# Patient Record
Sex: FEMALE | URBAN - METROPOLITAN AREA
[De-identification: names, ages, dates, MRNs, and addresses within clinical notes are randomized per-mention and may not be internally consistent; named-entity substitution may affect disease eponyms.]

---

## 2024-10-10 ENCOUNTER — OFFICE VISIT (OUTPATIENT)
Dept: URGENT CARE | Age: 19
End: 2024-10-10
Payer: COMMERCIAL

## 2024-10-10 VITALS
OXYGEN SATURATION: 98 % | TEMPERATURE: 96.8 F | SYSTOLIC BLOOD PRESSURE: 116 MMHG | DIASTOLIC BLOOD PRESSURE: 68 MMHG | RESPIRATION RATE: 16 BRPM | HEART RATE: 68 BPM

## 2024-10-10 DIAGNOSIS — B96.89 BACTERIAL SINUSITIS: Primary | ICD-10-CM

## 2024-10-10 DIAGNOSIS — J32.9 BACTERIAL SINUSITIS: Primary | ICD-10-CM

## 2024-10-10 RX ORDER — AZITHROMYCIN 250 MG/1
TABLET, FILM COATED ORAL
Qty: 6 TABLET | Refills: 0 | Status: SHIPPED | OUTPATIENT
Start: 2024-10-10 | End: 2024-10-15

## 2024-10-10 ASSESSMENT — ENCOUNTER SYMPTOMS
SINUS PAIN: 1
SINUS PRESSURE: 1
COUGH: 1
SORE THROAT: 1
SINUS COMPLAINT: 1

## 2024-10-10 NOTE — PROGRESS NOTES
Subjective   Patient ID: Ana Maria Pappas is a 19 y.o. female. They present today with a chief complaint of Sinus Problem (X 2 weeks).    History of Present Illness  Ana Maria Pappas is a 19 y.o. female. They present today with a chief complaint of Sinus Problem (X 2 weeks). Concern for sinus infection. Pxp rx flonase with minimal relief. Here for eval  Sinus Problem  Associated symptoms: congestion, cough and sore throat        Past Medical History  Allergies as of 10/10/2024    (No Known Allergies)       (Not in a hospital admission)       History reviewed. No pertinent past medical history.    History reviewed. No pertinent surgical history.         Review of Systems  Review of Systems   HENT:  Positive for congestion, sinus pressure, sinus pain and sore throat.    Respiratory:  Positive for cough.          Objective    Vitals:    10/10/24 0805   BP: 116/68   Pulse: 68   Resp: 16   Temp: 36 °C (96.8 °F)   SpO2: 98%     No LMP recorded.    Physical Exam  Vitals and nursing note reviewed.   Constitutional:       Appearance: Normal appearance.   HENT:      Head: Normocephalic and atraumatic.      Right Ear: Tympanic membrane normal.      Left Ear: Tympanic membrane normal.      Nose: Nose normal.      Mouth/Throat:      Mouth: Mucous membranes are moist.      Pharynx: Oropharynx is clear.   Eyes:      Extraocular Movements: Extraocular movements intact.      Conjunctiva/sclera: Conjunctivae normal.      Pupils: Pupils are equal, round, and reactive to light.   Cardiovascular:      Rate and Rhythm: Normal rate and regular rhythm.   Pulmonary:      Effort: Pulmonary effort is normal.      Breath sounds: Normal breath sounds.   Abdominal:      General: Bowel sounds are normal.      Palpations: Abdomen is soft.   Musculoskeletal:         General: Normal range of motion.      Cervical back: Normal range of motion and neck supple.   Skin:     General: Skin is warm.      Capillary Refill: Capillary refill takes less than 2  seconds.   Neurological:      General: No focal deficit present.      Mental Status: She is alert and oriented to person, place, and time. Mental status is at baseline.   Psychiatric:         Mood and Affect: Mood normal.         Behavior: Behavior normal.         Thought Content: Thought content normal.         Judgment: Judgment normal.         Procedures    Point of Care Test & Imaging Results from this visit  No results found for this visit on 10/10/24.   No results found.    Diagnostic study results (if any) were reviewed by ROBIN Alcala.    Assessment/Plan   Allergies, medications, history, and pertinent labs/EKGs/Imaging reviewed by ROBIN Alcala.     Medical Decision Making  S/s oif bacterial sinusitis.     Zpack sent    Tylenol, motrin, flonase, and claritin. Follow up with pcp    As a result of the work-up, the patient was discharged home.  she was informed of her diagnosis and instructed to come back with any concerns or worsening of condition.  she and was agreeable to the plan as discussed above.  she was given the opportunity to ask questions.  All of the patient's questions were answered.    This document was generated using the assistance of voice recognition software. If there are any errors of spelling, grammar, syntax, or meaning; please feel free to contact me directly for clarification.     Orders and Diagnoses  Diagnoses and all orders for this visit:  Bacterial sinusitis      Medical Admin Record      Patient disposition: Home    Electronically signed by ROBIN Alcala  8:07 AM

## 2025-05-04 ENCOUNTER — OFFICE VISIT (OUTPATIENT)
Dept: URGENT CARE | Age: 20
End: 2025-05-04
Payer: COMMERCIAL

## 2025-05-04 VITALS
HEART RATE: 73 BPM | RESPIRATION RATE: 17 BRPM | SYSTOLIC BLOOD PRESSURE: 120 MMHG | TEMPERATURE: 97.9 F | OXYGEN SATURATION: 98 % | DIASTOLIC BLOOD PRESSURE: 80 MMHG

## 2025-05-04 DIAGNOSIS — R09.89 CHEST CONGESTION: Primary | ICD-10-CM

## 2025-05-04 PROCEDURE — 99213 OFFICE O/P EST LOW 20 MIN: CPT

## 2025-05-04 RX ORDER — DOXYCYCLINE 100 MG/1
100 CAPSULE ORAL 2 TIMES DAILY
Qty: 14 CAPSULE | Refills: 0 | Status: SHIPPED | OUTPATIENT
Start: 2025-05-04 | End: 2025-05-11

## 2025-05-04 ASSESSMENT — ENCOUNTER SYMPTOMS
EYES NEGATIVE: 1
COUGH: 1
CONSTITUTIONAL NEGATIVE: 1
CARDIOVASCULAR NEGATIVE: 1

## 2025-05-04 NOTE — PATIENT INSTRUCTIONS
Consider taking Mucinex for congestion. Make sure to drink plenty of fluids while taking this medication as it increases its effectiveness.     Consider Zyrtec or Claritin    Consider Flonase Nasal Spray    Consider taking Sudafed to help decrease any congestion. If no history of high blood pressure.  Consider Corcedin BP for high blood pressure.    Use throat lozenges, buckwheat honey, gargling salt water to help relieve sore throat symptoms.     Recommend inhaling steam from a hot shower or hot bath as well as using saline sinus rinse for congestion. Recommend Yeyo Med sinus rinse. Make sure to use bottled or distilled water.

## 2025-05-04 NOTE — PROGRESS NOTES
"Subjective   Patient ID: Ana Maria Pappas is a 19 y.o. female. They present today with a chief complaint of Cough (Presents with cough, paranasal drainage, \"heavy\" chest for one week. Seen by PCP 4/25/25 denies treatment.  ).    History of Present Illness  19-year-old female presents clinic today with complaints of cough.  Patient states she has had postnasal drainage as well.  This been ongoing for slightly over a week.  She denies fevers or chills.  She states there is a heaviness in the chest from the congestion.  Denies chest pain.  She has been taking multiple over-the-counter cough and cold medications with little relief.          Past Medical History  Allergies as of 05/04/2025    (No Known Allergies)       Prescriptions Prior to Admission[1]     Medical History[2]    Surgical History[3]         Review of Systems  Review of Systems   Constitutional: Negative.    HENT:  Positive for congestion and postnasal drip.    Eyes: Negative.    Respiratory:  Positive for cough.    Cardiovascular: Negative.                                   Objective    Vitals:    05/04/25 1641   BP: 120/80   Pulse: 73   Resp: 17   Temp: 36.6 °C (97.9 °F)   TempSrc: Oral   SpO2: 98%     No LMP recorded.    Physical Exam  Constitutional:       General: She is not in acute distress.     Appearance: Normal appearance.   Cardiovascular:      Rate and Rhythm: Normal rate and regular rhythm.      Heart sounds: Normal heart sounds.   Pulmonary:      Effort: Pulmonary effort is normal.      Breath sounds: Examination of the right-lower field reveals rales. Examination of the left-lower field reveals rales. Rales present.   Neurological:      Mental Status: She is alert.         Procedures    Point of Care Test & Imaging Results from this visit  No results found for this visit on 05/04/25.   Imaging  No results found.    Cardiology, Vascular, and Other Imaging  No other imaging results found for the past 2 days      Diagnostic study results " (if any) were reviewed by Donaldo Parikh PA-C.    Assessment/Plan   Allergies, medications, history, and pertinent labs/EKGs/Imaging reviewed by Donaldo Parikh PA-C.     Medical Decision Making  Patient pleasant cooperative on examination.    Pulmonary examination did reveal some fine crackles to bilateral lower lung fields.  With this being found along with the continued congestion for over a week despite over-the-counter medications I did start patient on doxycycline.  This will cover the possibility of pneumonia versus sinusitis versus other.    I advised on proper over-the-counter medications to supplement with.    Monitor him for worsening or persistent signs.    Patient in agreement with plan.  Patient alert and oriented, no acute stress upon discharge.    Orders and Diagnoses  Diagnoses and all orders for this visit:  Chest congestion  -     doxycycline (Vibramycin) 100 mg capsule; Take 1 capsule (100 mg) by mouth 2 times a day for 7 days. Take with at least 8 ounces (large glass) of water, do not lie down for 30 minutes after      Medical Admin Record      Patient disposition: Home    Electronically signed by Donaldo Parikh PA-C  6:00 PM           [1] (Not in a hospital admission)   [2] No past medical history on file.  [3] No past surgical history on file.

## 2025-05-06 DIAGNOSIS — R09.89 CHEST CONGESTION: Primary | ICD-10-CM

## 2025-05-06 RX ORDER — ALBUTEROL SULFATE 90 UG/1
2 INHALANT RESPIRATORY (INHALATION) EVERY 4 HOURS PRN
Qty: 8 G | Refills: 0 | Status: SHIPPED | OUTPATIENT
Start: 2025-05-06 | End: 2026-05-06

## 2025-05-06 RX ORDER — AMOXICILLIN AND CLAVULANATE POTASSIUM 875; 125 MG/1; MG/1
1 TABLET, FILM COATED ORAL 2 TIMES DAILY
Qty: 20 TABLET | Refills: 0 | Status: SHIPPED | OUTPATIENT
Start: 2025-05-06 | End: 2025-05-16

## 2025-05-30 ENCOUNTER — OFFICE VISIT (OUTPATIENT)
Dept: URGENT CARE | Age: 20
End: 2025-05-30
Payer: COMMERCIAL

## 2025-05-30 VITALS
TEMPERATURE: 98.5 F | RESPIRATION RATE: 17 BRPM | OXYGEN SATURATION: 98 % | DIASTOLIC BLOOD PRESSURE: 80 MMHG | HEART RATE: 80 BPM | SYSTOLIC BLOOD PRESSURE: 123 MMHG

## 2025-05-30 DIAGNOSIS — L30.9 DERMATITIS: Primary | ICD-10-CM

## 2025-05-30 PROCEDURE — 99213 OFFICE O/P EST LOW 20 MIN: CPT | Performed by: NURSE PRACTITIONER

## 2025-05-30 RX ORDER — METHYLPREDNISOLONE 4 MG/1
TABLET ORAL
Qty: 21 TABLET | Refills: 0 | Status: SHIPPED | OUTPATIENT
Start: 2025-05-30 | End: 2025-06-05

## 2025-05-30 RX ORDER — TRIAMCINOLONE ACETONIDE 1 MG/G
CREAM TOPICAL
Qty: 80 G | Refills: 0 | Status: SHIPPED | OUTPATIENT
Start: 2025-05-30

## 2025-05-30 ASSESSMENT — ENCOUNTER SYMPTOMS
SHORTNESS OF BREATH: 0
RHINORRHEA: 0
DIARRHEA: 0
COUGH: 0
FATIGUE: 0
EYE PAIN: 0
NAIL CHANGES: 0
FEVER: 0
SORE THROAT: 0
VOMITING: 0
ANOREXIA: 0

## 2025-05-30 NOTE — PROGRESS NOTES
Subjective   Patient ID: Ana Maria Pappas is a 19 y.o. female. They present today with a chief complaint of Rash (Presents with complaint of rash and itching bilateral lower extremities for 3 weeks. ).    History of Present Illness    History provided by:  Patient   used: No    Rash  This is a new problem. Episode onset: 3 weeks. The problem has been gradually worsening since onset. Location: Bilateral lower legs. The rash is characterized by redness and itchiness. Associated with: Shaving. Pertinent negatives include no anorexia, congestion, cough, diarrhea, eye pain, facial edema, fatigue, fever, joint pain, nail changes, rhinorrhea, shortness of breath, sore throat or vomiting. Past treatments include nothing. The treatment provided no relief.       Past Medical History  Allergies as of 05/30/2025    (No Known Allergies)       Prescriptions Prior to Admission[1]     Medical History[2]    Surgical History[3]         Review of Systems  Review of Systems   Constitutional:  Negative for fatigue and fever.   HENT:  Negative for congestion, rhinorrhea and sore throat.    Eyes:  Negative for pain.   Respiratory:  Negative for cough and shortness of breath.    Gastrointestinal:  Negative for anorexia, diarrhea and vomiting.   Musculoskeletal:  Negative for joint pain.   Skin:  Positive for rash. Negative for nail changes.                                  Objective    Vitals:    05/30/25 1439   BP: 123/80   Pulse: 80   Resp: 17   Temp: 36.9 °C (98.5 °F)   TempSrc: Oral   SpO2: 98%     No LMP recorded.    Physical Exam  Skin:     Comments: Erythematous papules with ingrown hair and macules with scaliness noted on bilateral lower extremities.  No bleeding discharge or tenderness         Procedures    Point of Care Test & Imaging Results from this visit  No results found for this visit on 05/30/25.   Imaging  No results found.    Cardiology, Vascular, and Other Imaging  No other imaging results found  for the past 2 days      Diagnostic study results (if any) were reviewed by ROBIN Trent.    Assessment/Plan   Allergies, medications, history, and pertinent labs/EKGs/Imaging reviewed by ROBIN Trent.     Medical Decision Making  Likely dermatitis from razor burn related to shaving  Referred to dermatologist.  -Avoid using or touching whatever might have caused your rash.  -Protect your skin from anything that might irritate it or cause an allergy. For example, wear gloves if you need to work with harsh soaps.  -Avoid scratching your skin. It might help to:  -Wear cotton gloves at night.  -Keep your nails short and clean.  -Cover the parts of your skin that itch.  F/u with PCP if you develop the following.  -You have a rash that does not go away within 2 weeks.  -Your rash gets worse or spreads over large parts of your body.  -You have signs of infection like swelling, warmth, pain, or fever.  Pt verbalized understanding of the instructions.   Orders and Diagnoses  Diagnoses and all orders for this visit:  Dermatitis  -     methylPREDNISolone (Medrol Dospak) 4 mg tablets; Take as directed on package.  -     triamcinolone (Kenalog) 0.1 % cream; Apply to affected area 1-2 times daily as needed. Avoid face and groin.      Medical Admin Record      Patient disposition: Home    Electronically signed by ROBIN Trent  2:57 PM           [1] (Not in a hospital admission)   [2] No past medical history on file.  [3] No past surgical history on file.

## 2025-07-12 ENCOUNTER — APPOINTMENT (OUTPATIENT)
Dept: URGENT CARE | Age: 20
End: 2025-07-12
Payer: COMMERCIAL

## 2025-07-12 ENCOUNTER — OFFICE VISIT (OUTPATIENT)
Dept: URGENT CARE | Age: 20
End: 2025-07-12
Payer: COMMERCIAL

## 2025-07-12 VITALS
OXYGEN SATURATION: 98 % | HEART RATE: 97 BPM | TEMPERATURE: 97.9 F | DIASTOLIC BLOOD PRESSURE: 87 MMHG | SYSTOLIC BLOOD PRESSURE: 142 MMHG

## 2025-07-12 DIAGNOSIS — B34.8 RHINOVIRUS: Primary | ICD-10-CM

## 2025-07-12 DIAGNOSIS — J02.9 SORE THROAT: ICD-10-CM

## 2025-07-12 LAB
POC HUMAN RHINOVIRUS PCR: POSITIVE
POC INFLUENZA A VIRUS PCR: NEGATIVE
POC INFLUENZA B VIRUS PCR: NEGATIVE
POC RESPIRATORY SYNCYTIAL VIRUS PCR: NEGATIVE
POC STREPTOCOCCUS PYOGENES (GROUP A STREP) PCR: NEGATIVE

## 2025-07-12 RX ORDER — KETOCONAZOLE 20 MG/ML
SHAMPOO, SUSPENSION TOPICAL 2 TIMES WEEKLY
COMMUNITY

## 2025-07-12 RX ORDER — KETOCONAZOLE 20 MG/G
CREAM TOPICAL DAILY
COMMUNITY

## 2025-07-12 ASSESSMENT — ENCOUNTER SYMPTOMS
CHILLS: 0
COUGH: 0
NAUSEA: 0
HEADACHES: 0
RHINORRHEA: 0
SHORTNESS OF BREATH: 0
FEVER: 0
SORE THROAT: 1
VOMITING: 0

## 2025-07-12 NOTE — PROGRESS NOTES
Subjective   Patient ID: Ana Maria Pappas is a 19 y.o. female. They present today with a chief complaint of Sore Throat (Sore throat x 2 days).    History of Present Illness  Patient presents for evaluation of sore throat that began last night.  She states it worsened a little bit more into today despite taking Zyrtec.  Patient denies any fevers, chills, headaches, nasal congestion, runny nose, postnasal drainage, ear discomfort or cough.  She denies any known exposure to illnesses.      Sore Throat   Pertinent negatives include no congestion, coughing, ear pain, headaches, shortness of breath or vomiting.       Past Medical History  Allergies as of 07/12/2025    (No Known Allergies)       Prescriptions Prior to Admission[1]     Medical History[2]    Surgical History[3]     reports that she has never smoked. She has never used smokeless tobacco.    Review of Systems  Review of Systems   Constitutional:  Negative for chills and fever.   HENT:  Positive for sore throat. Negative for congestion, ear pain and rhinorrhea.    Respiratory:  Negative for cough and shortness of breath.    Gastrointestinal:  Negative for nausea and vomiting.   Neurological:  Negative for headaches.                                  Objective    Vitals:    07/12/25 1734   BP: 142/87   Pulse: 97   Temp: 36.6 °C (97.9 °F)   SpO2: 98%     Patient's last menstrual period was 06/16/2025 (approximate).    Physical Exam  Vitals reviewed.   Constitutional:       General: She is not in acute distress.  HENT:      Right Ear: Tympanic membrane and ear canal normal.      Left Ear: Tympanic membrane and ear canal normal.      Nose: Congestion present. No rhinorrhea.      Mouth/Throat:      Mouth: Mucous membranes are moist.      Pharynx: Posterior oropharyngeal erythema present. No oropharyngeal exudate.      Tonsils: No tonsillar exudate or tonsillar abscesses. 1+ on the right. 1+ on the left.   Cardiovascular:      Rate and Rhythm: Normal rate and  regular rhythm.      Heart sounds: Normal heart sounds.   Pulmonary:      Effort: Pulmonary effort is normal. No respiratory distress.      Breath sounds: Normal breath sounds.   Lymphadenopathy:      Cervical: No cervical adenopathy.   Neurological:      Mental Status: She is alert.         Procedures    Point of Care Test & Imaging Results from this visit  Results for orders placed or performed in visit on 07/12/25   POCT SPOTFIRE R/ST Panel Mini w/Strep A ([facility]) manually resulted   Result Value Ref Range    POC Group A Strep, PCR Negative Negative    POC Respiratory Syncytial Virus PCR Negative Negative    POC Influenza A Virus PCR Negative Negative    POC Influenza B Virus PCR Negative Negative    POC Human Rhinovirus PCR Positive (A) Negative      Imaging  No results found.    Cardiology, Vascular, and Other Imaging  No other imaging results found for the past 2 days      Diagnostic study results (if any) were reviewed by Fina Hair PA-C.    Assessment/Plan   Allergies, medications, history, and pertinent labs/EKGs/Imaging reviewed by Fina Hair PA-C.     Medical Decision Making  MDM: History and examination consistent with viral URI.  In-house testing is positive for rhinovirus, negative for strep, RSV and influenza.  reassured there are no signs of pneumonia, sinusitis, otitis or other bacterial etiology. Plan at this time is supportive measures and symptomatic care at home.  We discussed she can alternate Tylenol and ibuprofen as needed for pain relief and use a multi cold symptom medicine like DayQuil or NyQuil should she develop nasal congestion, runny nose with this.  Advised to go to ER if worsens, otherwise follow with pcp. Patient verbalized understanding and agrees with plan.      Orders and Diagnoses  Diagnoses and all orders for this visit:  Rhinovirus  Sore throat  -     POCT SPOTFIRE R/ST Panel Mini w/Strep A ([facility]) manually resulted      Medical Admin Record      Patient  disposition: Home    Electronically signed by Fina Hair PA-C  5:59 PM           [1] (Not in a hospital admission)   [2] No past medical history on file.  [3] No past surgical history on file.